# Patient Record
Sex: MALE | Race: WHITE | URBAN - METROPOLITAN AREA
[De-identification: names, ages, dates, MRNs, and addresses within clinical notes are randomized per-mention and may not be internally consistent; named-entity substitution may affect disease eponyms.]

---

## 2024-06-14 LAB
ESTIMATED AVERAGE GLUCOSE: NORMAL
HBA1C MFR BLD: 6.3 %

## 2024-06-28 LAB
ALBUMIN: 3.6 G/DL
ALP BLD-CCNC: 67 U/L
ALT SERPL-CCNC: 13 U/L
ANION GAP SERPL CALCULATED.3IONS-SCNC: 0.8 MMOL/L
AST SERPL-CCNC: 14 U/L
BASOPHILS ABSOLUTE: 0 /ΜL
BASOPHILS RELATIVE PERCENT: 0.3 %
BILIRUB SERPL-MCNC: 0.4 MG/DL (ref 0.1–1.4)
BUN BLDV-MCNC: 14 MG/DL
CALCIUM SERPL-MCNC: 9.4 MG/DL
CHLORIDE BLD-SCNC: 99 MMOL/L
CO2: 31 MMOL/L
CREAT SERPL-MCNC: 0.9 MG/DL
EOSINOPHILS ABSOLUTE: 0.2 /ΜL
EOSINOPHILS RELATIVE PERCENT: 2.3 %
GFR, ESTIMATED: 88
GLUCOSE BLD-MCNC: 101 MG/DL
HCT VFR BLD CALC: 31.7 % (ref 41–53)
HEMOGLOBIN: 10.5 G/DL (ref 13.5–17.5)
LYMPHOCYTES ABSOLUTE: 1.8 /ΜL
LYMPHOCYTES RELATIVE PERCENT: 24.4 %
MCH RBC QN AUTO: 28.3 PG
MCHC RBC AUTO-ENTMCNC: 33.2 G/DL
MCV RBC AUTO: 85.2 FL
MONOCYTES ABSOLUTE: 0.5 /ΜL
MONOCYTES RELATIVE PERCENT: 6.8 %
NEUTROPHILS ABSOLUTE: 0.5 /ΜL
NEUTROPHILS RELATIVE PERCENT: 66.2 %
PLATELET # BLD: 414 K/ΜL
PMV BLD AUTO: 7.2 FL
POTASSIUM SERPL-SCNC: 4.9 MMOL/L
RBC # BLD: 7.3 10^6/ΜL
SODIUM BLD-SCNC: 138 MMOL/L
TOTAL PROTEIN: 8.3 G/DL (ref 6.4–8.2)
WBC # BLD: 7.3 10^3/ML

## 2024-10-20 SDOH — HEALTH STABILITY: PHYSICAL HEALTH: ON AVERAGE, HOW MANY MINUTES DO YOU ENGAGE IN EXERCISE AT THIS LEVEL?: 0 MIN

## 2024-10-20 SDOH — HEALTH STABILITY: PHYSICAL HEALTH: ON AVERAGE, HOW MANY DAYS PER WEEK DO YOU ENGAGE IN MODERATE TO STRENUOUS EXERCISE (LIKE A BRISK WALK)?: 0 DAYS

## 2024-10-23 ENCOUNTER — OFFICE VISIT (OUTPATIENT)
Dept: INTERNAL MEDICINE CLINIC | Age: 54
End: 2024-10-23

## 2024-10-23 VITALS
BODY MASS INDEX: 34.55 KG/M2 | HEART RATE: 122 BPM | HEIGHT: 66 IN | SYSTOLIC BLOOD PRESSURE: 150 MMHG | DIASTOLIC BLOOD PRESSURE: 80 MMHG | OXYGEN SATURATION: 94 % | WEIGHT: 215 LBS

## 2024-10-23 DIAGNOSIS — I10 PRIMARY HYPERTENSION: ICD-10-CM

## 2024-10-23 DIAGNOSIS — E11.59 TYPE 2 DIABETES MELLITUS WITH OTHER CIRCULATORY COMPLICATION, WITHOUT LONG-TERM CURRENT USE OF INSULIN (HCC): ICD-10-CM

## 2024-10-23 DIAGNOSIS — E66.811 CLASS 1 OBESITY DUE TO EXCESS CALORIES WITH SERIOUS COMORBIDITY AND BODY MASS INDEX (BMI) OF 34.0 TO 34.9 IN ADULT: ICD-10-CM

## 2024-10-23 DIAGNOSIS — Z78.9: ICD-10-CM

## 2024-10-23 DIAGNOSIS — E66.09 CLASS 1 OBESITY DUE TO EXCESS CALORIES WITH SERIOUS COMORBIDITY AND BODY MASS INDEX (BMI) OF 34.0 TO 34.9 IN ADULT: ICD-10-CM

## 2024-10-23 DIAGNOSIS — I73.9 PERIPHERAL VASCULAR DISEASE (HCC): ICD-10-CM

## 2024-10-23 DIAGNOSIS — Z72.0 TOBACCO USE: ICD-10-CM

## 2024-10-23 DIAGNOSIS — Z11.4 SCREENING FOR HIV (HUMAN IMMUNODEFICIENCY VIRUS): ICD-10-CM

## 2024-10-23 DIAGNOSIS — Z11.59 NEED FOR HEPATITIS C SCREENING TEST: ICD-10-CM

## 2024-10-23 DIAGNOSIS — Z59.9 FINANCIAL DIFFICULTIES: ICD-10-CM

## 2024-10-23 DIAGNOSIS — Z89.612 S/P AKA (ABOVE KNEE AMPUTATION), LEFT (HCC): Primary | ICD-10-CM

## 2024-10-23 PROBLEM — E11.9 DIABETES MELLITUS (HCC): Status: ACTIVE | Noted: 2024-10-23

## 2024-10-23 PROBLEM — E78.5 HYPERLIPIDEMIA: Status: ACTIVE | Noted: 2024-10-23

## 2024-10-23 PROBLEM — G54.6 PHANTOM LIMB PAIN (HCC): Status: ACTIVE | Noted: 2024-10-23

## 2024-10-23 PROBLEM — S78.119A ABOVE-KNEE AMPUTATION (HCC): Status: ACTIVE | Noted: 2024-10-23

## 2024-10-23 PROBLEM — R26.2 DISABILITY OF WALKING: Status: ACTIVE | Noted: 2024-10-23

## 2024-10-23 PROBLEM — R26.9 UNSPECIFIED ABNORMALITIES OF GAIT AND MOBILITY: Status: ACTIVE | Noted: 2024-01-09

## 2024-10-23 RX ORDER — RIVAROXABAN 2.5 MG/1
2.5 TABLET, FILM COATED ORAL 2 TIMES DAILY
COMMUNITY

## 2024-10-23 RX ORDER — ATORVASTATIN CALCIUM 80 MG/1
1 TABLET, FILM COATED ORAL
COMMUNITY

## 2024-10-23 RX ORDER — METFORMIN HYDROCHLORIDE 500 MG/1
500 TABLET, EXTENDED RELEASE ORAL
COMMUNITY

## 2024-10-23 RX ORDER — GABAPENTIN 300 MG/1
1200 CAPSULE ORAL 3 TIMES DAILY
COMMUNITY
Start: 2024-06-21

## 2024-10-23 RX ORDER — LOSARTAN POTASSIUM 50 MG/1
50 TABLET ORAL DAILY
Qty: 30 TABLET | Refills: 1 | Status: SHIPPED | OUTPATIENT
Start: 2024-10-23

## 2024-10-23 SDOH — ECONOMIC STABILITY: FOOD INSECURITY: WITHIN THE PAST 12 MONTHS, THE FOOD YOU BOUGHT JUST DIDN'T LAST AND YOU DIDN'T HAVE MONEY TO GET MORE.: SOMETIMES TRUE

## 2024-10-23 SDOH — ECONOMIC STABILITY - INCOME SECURITY: PROBLEM RELATED TO HOUSING AND ECONOMIC CIRCUMSTANCES, UNSPECIFIED: Z59.9

## 2024-10-23 SDOH — ECONOMIC STABILITY: INCOME INSECURITY: HOW HARD IS IT FOR YOU TO PAY FOR THE VERY BASICS LIKE FOOD, HOUSING, MEDICAL CARE, AND HEATING?: SOMEWHAT HARD

## 2024-10-23 SDOH — ECONOMIC STABILITY: FOOD INSECURITY: WITHIN THE PAST 12 MONTHS, YOU WORRIED THAT YOUR FOOD WOULD RUN OUT BEFORE YOU GOT MONEY TO BUY MORE.: SOMETIMES TRUE

## 2024-10-23 ASSESSMENT — PATIENT HEALTH QUESTIONNAIRE - PHQ9
1. LITTLE INTEREST OR PLEASURE IN DOING THINGS: MORE THAN HALF THE DAYS
7. TROUBLE CONCENTRATING ON THINGS, SUCH AS READING THE NEWSPAPER OR WATCHING TELEVISION: NOT AT ALL
SUM OF ALL RESPONSES TO PHQ QUESTIONS 1-9: 4
8. MOVING OR SPEAKING SO SLOWLY THAT OTHER PEOPLE COULD HAVE NOTICED. OR THE OPPOSITE, BEING SO FIGETY OR RESTLESS THAT YOU HAVE BEEN MOVING AROUND A LOT MORE THAN USUAL: NOT AT ALL
10. IF YOU CHECKED OFF ANY PROBLEMS, HOW DIFFICULT HAVE THESE PROBLEMS MADE IT FOR YOU TO DO YOUR WORK, TAKE CARE OF THINGS AT HOME, OR GET ALONG WITH OTHER PEOPLE: NOT DIFFICULT AT ALL
2. FEELING DOWN, DEPRESSED OR HOPELESS: SEVERAL DAYS
6. FEELING BAD ABOUT YOURSELF - OR THAT YOU ARE A FAILURE OR HAVE LET YOURSELF OR YOUR FAMILY DOWN: NOT AT ALL
5. POOR APPETITE OR OVEREATING: NOT AT ALL
9. THOUGHTS THAT YOU WOULD BE BETTER OFF DEAD, OR OF HURTING YOURSELF: NOT AT ALL
3. TROUBLE FALLING OR STAYING ASLEEP: SEVERAL DAYS
SUM OF ALL RESPONSES TO PHQ9 QUESTIONS 1 & 2: 3
SUM OF ALL RESPONSES TO PHQ QUESTIONS 1-9: 4
4. FEELING TIRED OR HAVING LITTLE ENERGY: NOT AT ALL
SUM OF ALL RESPONSES TO PHQ QUESTIONS 1-9: 4
SUM OF ALL RESPONSES TO PHQ QUESTIONS 1-9: 4

## 2024-10-23 NOTE — PATIENT INSTRUCTIONS
-New blood pressure medication losartan 50 mg once a day  -Please obtain fasting blood work at your earliest convenience  -I have put a referral into our community health worker to see if she can help you navigate some of your financial/transportation needs  -Reach out to Medicaid, to see if they can help you arrange transportation to your doctors appointments  -I will work on your Worker's Compensation papers but this will take some time  -Return to office in 1 month so we can review paperwork, check the progress of the medicine, review your labs

## 2024-10-23 NOTE — PROGRESS NOTES
[facility]  [address]  [address]  [place], [place] [address]  Tel: [phone]    Denys Dillon  1970  male    CC:   Chief Complaint   Patient presents with    New Patient       HPI:   Patient is a 54-year-old gentleman who presents today to establish care, as well as wants to pursue Worker's Comp. for his recent surgical history.    Over the past year has gone through multiple surgeries for trauma to his right tibia/fibula, as well as peripheral vascular disease, with subsequent amputation and postsurgical infection.    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  Worked on naval ships, in some kind of maintenance capacity.  States his initial problem started November 2023, he was traveling to get back on the ship while they were docked in [facility].  Had a fall, where he fractured his right tibia and fibula.  Subsequently had what sounds like ORIF in [facility].  That recovered well, but around April of this year began to have loss of sensation in his left foot.  He first noticed it while he was working on the ship, symptoms gradually increased until he saw his ship doctor who recommended he go to the hospital when they were docked at the next port which was [facility].    In [place], concerns for severe PVD so sounds like he had a right to left femorofemoral bypass.  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  Tolerated this procedure well, but while hospitalized in [place] began to have seepage and drainage from the area.  There was concerns for a stump infection.  Transferred back [facility] and ended up at [facility].  Underwent surgery with the vascular surgeon there, incision and drainage as well as debridement of necrotic tissue.  Received 6 weeks of IV antibiotic therapy.  Discharged to a skilled nursing facility for about 6 weeks and subsequently has been at home with his

## 2024-10-25 ENCOUNTER — TELEPHONE (OUTPATIENT)
Dept: INTERNAL MEDICINE CLINIC | Age: 54
End: 2024-10-25

## 2024-10-25 PROBLEM — S82.871A: Status: ACTIVE | Noted: 2024-10-25

## 2024-10-25 PROBLEM — F17.210 NICOTINE DEPENDENCE, CIGARETTES, UNCOMPLICATED: Status: ACTIVE | Noted: 2024-10-25

## 2024-10-25 PROBLEM — E66.01 MORBID OBESITY: Status: ACTIVE | Noted: 2024-10-25

## 2024-10-25 PROBLEM — J44.9 CHRONIC OBSTRUCTIVE PULMONARY DISEASE, UNSPECIFIED (HCC): Status: ACTIVE | Noted: 2024-10-25

## 2024-10-25 PROBLEM — I73.9 PERIPHERAL VASCULAR DISEASE (HCC): Status: ACTIVE | Noted: 2024-06-14

## 2024-10-25 PROBLEM — F17.200 NICOTINE DEPENDENCE: Status: ACTIVE | Noted: 2024-01-05

## 2024-10-25 PROBLEM — T87.40: Status: ACTIVE | Noted: 2024-06-14

## 2024-10-30 ENCOUNTER — TELEPHONE (OUTPATIENT)
Dept: OTHER | Age: 54
End: 2024-10-30

## 2024-10-30 NOTE — TELEPHONE ENCOUNTER
[facility]--CHW    CHW made initial call to make contact with patient and was able to leave a voicemail for a call to be returned due to no answer at this time.    Patient will be deferred for 7 days.

## 2024-10-31 ENCOUNTER — COMMUNITY OUTREACH (OUTPATIENT)
Dept: OTHER | Age: 54
End: 2024-10-31

## 2024-10-31 NOTE — PROGRESS NOTES
[facility]--CHW    Patient called CHW back from the initial call that was made. CHW introduced herself and the partnership program to patient. Patient told CHW that he was recently working in the [facility] with the , when he lost his leg on the job. This occurred in June of this year. He was given a prosthetic leg, but Patient said he has had 8 surgeries since June. Patient has 2 claims in process with the help from attorneys: Workman's Comp and SSDI. Patient stated he was living off his savings and has no other income at this time.     Patient reported his main barrier to be housing. He currently lives with his brother and gives him $300 for rent. Patient stated he would like his own apartment. CHW advised patient that housing options is very tight right now. He could be put on waiting lists after he has stable income. Patient said he is open to moving back to [facility] to find affordable housing.    Patient and CHW also discussed food insecurity and mental health. He declined both services. He stated that he is receiving $300 in EBT monthly and he gets by fine with that amount. CHW offered food pantries to supplement and patient declined the information. Patient stated his sadness and depression over his current situation has gotten better and he doesn't need any help finding a therapist or adding any mental health medication at this time.    CHW sent patient a follow-up text to advise him how to reach out in the future if he needs assistance. At this time, this referral is completed.

## 2025-01-20 ENCOUNTER — COMMUNITY OUTREACH (OUTPATIENT)
Dept: INTERNAL MEDICINE CLINIC | Age: 55
End: 2025-01-20

## 2025-01-20 NOTE — PROGRESS NOTES
Patient's  shows they are overdue for Hemoglobin A1C  Care Everywhere and  files searched.   updated with 6/14/24 A1C result.